# Patient Record
Sex: FEMALE | Race: BLACK OR AFRICAN AMERICAN | NOT HISPANIC OR LATINO | Employment: UNEMPLOYED | URBAN - METROPOLITAN AREA
[De-identification: names, ages, dates, MRNs, and addresses within clinical notes are randomized per-mention and may not be internally consistent; named-entity substitution may affect disease eponyms.]

---

## 2023-06-14 PROBLEM — S66.819A FLEXOR TENDON RUPTURE OF HAND: Status: ACTIVE | Noted: 2023-06-14

## 2024-08-06 ENCOUNTER — PATIENT OUTREACH (OUTPATIENT)
Dept: ADMINISTRATIVE | Facility: OTHER | Age: 34
End: 2024-08-06

## 2024-10-03 ENCOUNTER — PATIENT OUTREACH (OUTPATIENT)
Dept: ADMINISTRATIVE | Facility: OTHER | Age: 34
End: 2024-10-03

## 2024-10-03 NOTE — PROGRESS NOTES
CHW - Outreach Attempt    Community Health Worker left a voicemail message for 1st attempt to contact patient regarding: follow up phone interview.   Community Health Worker to attempt to contact patient on: 10/03/2024.

## 2024-11-22 ENCOUNTER — PATIENT OUTREACH (OUTPATIENT)
Dept: ADMINISTRATIVE | Facility: OTHER | Age: 34
End: 2024-11-22

## 2024-11-22 NOTE — PROGRESS NOTES
CHW - Outreach Attempt    Community Health Worker left a voicemail message for 2nd attempt to contact patient regarding: follow up phone interview.  Community Health Worker to attempt to contact patient on: 11/22/2024

## 2025-01-06 PROBLEM — K59.00 CONSTIPATION: Status: ACTIVE | Noted: 2025-01-06

## 2025-02-03 ENCOUNTER — PATIENT OUTREACH (OUTPATIENT)
Dept: ADMINISTRATIVE | Facility: OTHER | Age: 35
End: 2025-02-03

## 2025-02-03 NOTE — PROGRESS NOTES
CHW - Outreach Attempt    Community Health Worker left a voicemail message for 3rd attempt to contact patient regarding: follow up phone interview.   Community Health Worker to attempt to contact patient on: 02/03/2025.

## 2025-03-31 ENCOUNTER — PATIENT OUTREACH (OUTPATIENT)
Dept: ADMINISTRATIVE | Facility: OTHER | Age: 35
End: 2025-03-31

## 2025-03-31 NOTE — PROGRESS NOTES
,CHW - Outreach Attempt    Community Health Worker left a voicemail message for 4th attempt to contact patient regarding: follow up phone interview.  Community Health Worker to attempt to contact patient on: 03/31/2025.                  CHW - Case Closure    This Community Health Worker spoke to patient via telephone today.   Pt/Caregiver reported: unable to contact patient regarding follow up phone interview.   Pt/Caregiver denied any additional needs at this time and agrees with episode closure at this time.     Provided patient with Community Health Worker's contact information and encouraged   her to contact this Community Health Worker if additional needs arise.